# Patient Record
Sex: FEMALE | Race: WHITE | NOT HISPANIC OR LATINO
[De-identification: names, ages, dates, MRNs, and addresses within clinical notes are randomized per-mention and may not be internally consistent; named-entity substitution may affect disease eponyms.]

---

## 2019-11-25 ENCOUNTER — FORM ENCOUNTER (OUTPATIENT)
Age: 59
End: 2019-11-25

## 2020-05-07 ENCOUNTER — TRANSCRIPTION ENCOUNTER (OUTPATIENT)
Age: 60
End: 2020-05-07

## 2020-12-31 DIAGNOSIS — Z00.00 ENCOUNTER FOR GENERAL ADULT MEDICAL EXAMINATION W/OUT ABNORMAL FINDINGS: ICD-10-CM

## 2021-01-26 ENCOUNTER — TRANSCRIPTION ENCOUNTER (OUTPATIENT)
Age: 61
End: 2021-01-26

## 2021-03-24 DIAGNOSIS — Z87.891 PERSONAL HISTORY OF NICOTINE DEPENDENCE: ICD-10-CM

## 2021-03-24 DIAGNOSIS — Z80.49 FAMILY HISTORY OF MALIGNANT NEOPLASM OF OTHER GENITAL ORGANS: ICD-10-CM

## 2021-03-24 DIAGNOSIS — Z78.9 OTHER SPECIFIED HEALTH STATUS: ICD-10-CM

## 2021-03-24 DIAGNOSIS — N63.0 UNSPECIFIED LUMP IN UNSPECIFIED BREAST: ICD-10-CM

## 2021-03-29 ENCOUNTER — TRANSCRIPTION ENCOUNTER (OUTPATIENT)
Age: 61
End: 2021-03-29

## 2021-03-29 ENCOUNTER — APPOINTMENT (OUTPATIENT)
Dept: BREAST CENTER | Facility: CLINIC | Age: 61
End: 2021-03-29
Payer: COMMERCIAL

## 2021-03-29 VITALS
SYSTOLIC BLOOD PRESSURE: 144 MMHG | WEIGHT: 168 LBS | HEIGHT: 67 IN | BODY MASS INDEX: 26.37 KG/M2 | HEART RATE: 96 BPM | DIASTOLIC BLOOD PRESSURE: 93 MMHG

## 2021-03-29 DIAGNOSIS — Z78.9 OTHER SPECIFIED HEALTH STATUS: ICD-10-CM

## 2021-03-29 PROCEDURE — 99072 ADDL SUPL MATRL&STAF TM PHE: CPT

## 2021-03-29 PROCEDURE — 99213 OFFICE O/P EST LOW 20 MIN: CPT

## 2021-03-29 RX ORDER — ESTRADIOL 2 MG/1
2 RING VAGINAL
Refills: 0 | Status: DISCONTINUED | COMMUNITY
End: 2021-03-29

## 2021-03-29 NOTE — HISTORY OF PRESENT ILLNESS
[FreeTextEntry1] : The patient is a 60-year-old G1, P1 postmenopausal white female.  She underwent menarche at age 13 and had her first child at age 31.  She underwent menopause at age 54 and never took any hormone replacement therapy.  She has no family history of breast cancer but her mother had ovarian cancer at age 55.  The patient has a history of fibrocystic mastopathy and comes in for routine yearly clinical exams and gets yearly mammography and ultrasound.

## 2021-03-29 NOTE — ASSESSMENT
[FreeTextEntry1] : The patient is a 60-year-old G1, P1 postmenopausal white female.  She underwent menarche at age 13 and had her first child at age 31.  She underwent menopause at age 54 and never took any hormone replacement therapy.  She has no family history of breast cancer but her mother had ovarian cancer at age 55.  The patient has a history of fibrocystic mastopathy.  On exam today I cannot feel any suspicious densities in either breast.  She underwent her last mammography and ultrasound at [facility] on January 4, 2021 just showing some benign intramammary lymph nodes which are unchanged.  The patient was reassured and should follow-up again in 1 year and will be due for her next bilateral mammography and ultrasound in January 2022.

## 2021-03-29 NOTE — ASSESSMENT
[FreeTextEntry1] : The patient is a 60-year-old G1, P1 postmenopausal white female.  She underwent menarche at age 13 and had her first child at age 31.  She underwent menopause at age 54 and never took any hormone replacement therapy.  She has no family history of breast cancer but her mother had ovarian cancer at age 55.  The patient has a history of fibrocystic mastopathy.  On exam today I cannot feel any suspicious densities in either breast.  She underwent her last mammography and ultrasound at [facility] on January 4, 2021 just showing some benign intramammary lymph nodes which are unchanged.  The patient was reassured and should follow-up again in 1 year and will be due for her next bilateral mammography and ultrasound in January 2022.

## 2021-03-29 NOTE — PHYSICAL EXAM
[Normocephalic] : normocephalic [Atraumatic] : atraumatic [EOMI] : extra ocular movement intact [Supple] : supple [No Supraclavicular Adenopathy] : no supraclavicular adenopathy [No Cervical Adenopathy] : no cervical adenopathy [Examined in the supine and seated position] : examined in the supine and seated position [Symmetrical] : symmetrical [Breast Mass Right Breast ___cm] : no masses [Breast Mass Left Breast ___cm] : no masses [Breast Nipple Inversion] : nipples not inverted [Breast Nipple Retraction] : nipples not retracted [Breast Nipple Flattening] : nipples not flattened [Breast Nipple Fissures] : nipples not fissured [Breast Abnormal Lactation (Galactorrhea)] : no galactorrhea [Breast Abnormal Secretion Bloody Fluid] : no bloody discharge [Breast Abnormal Secretion Serous Fluid] : no serous discharge [Breast Abnormal Secretion Opalescent Fluid] : no milky discharge [No Axillary Lymphadenopathy] : no left axillary lymphadenopathy [No Edema] : no edema [No Rashes] : no rashes [No Ulceration] : no ulceration [de-identified] : The patient has symmetrical C-cup breasts.  I cannot feel any suspicious densities in either breast.  She has no axillary, supraclavicular, or cervical adenopathy.

## 2021-03-29 NOTE — PHYSICAL EXAM
[Normocephalic] : normocephalic [Atraumatic] : atraumatic [EOMI] : extra ocular movement intact [Supple] : supple [No Supraclavicular Adenopathy] : no supraclavicular adenopathy [No Cervical Adenopathy] : no cervical adenopathy [Examined in the supine and seated position] : examined in the supine and seated position [Symmetrical] : symmetrical [Breast Mass Right Breast ___cm] : no masses [Breast Mass Left Breast ___cm] : no masses [Breast Nipple Inversion] : nipples not inverted [Breast Nipple Retraction] : nipples not retracted [Breast Nipple Flattening] : nipples not flattened [Breast Nipple Fissures] : nipples not fissured [Breast Abnormal Lactation (Galactorrhea)] : no galactorrhea [Breast Abnormal Secretion Bloody Fluid] : no bloody discharge [Breast Abnormal Secretion Serous Fluid] : no serous discharge [Breast Abnormal Secretion Opalescent Fluid] : no milky discharge [No Axillary Lymphadenopathy] : no left axillary lymphadenopathy [No Edema] : no edema [No Rashes] : no rashes [No Ulceration] : no ulceration [de-identified] : The patient has symmetrical C-cup breasts.  I cannot feel any suspicious densities in either breast.  She has no axillary, supraclavicular, or cervical adenopathy.

## 2021-03-29 NOTE — PHYSICAL EXAM
[Normocephalic] : normocephalic [Atraumatic] : atraumatic [EOMI] : extra ocular movement intact [Supple] : supple [No Supraclavicular Adenopathy] : no supraclavicular adenopathy [No Cervical Adenopathy] : no cervical adenopathy [Examined in the supine and seated position] : examined in the supine and seated position [Symmetrical] : symmetrical [Breast Mass Right Breast ___cm] : no masses [Breast Mass Left Breast ___cm] : no masses [Breast Nipple Inversion] : nipples not inverted [Breast Nipple Retraction] : nipples not retracted [Breast Nipple Flattening] : nipples not flattened [Breast Nipple Fissures] : nipples not fissured [Breast Abnormal Lactation (Galactorrhea)] : no galactorrhea [Breast Abnormal Secretion Bloody Fluid] : no bloody discharge [Breast Abnormal Secretion Serous Fluid] : no serous discharge [Breast Abnormal Secretion Opalescent Fluid] : no milky discharge [No Axillary Lymphadenopathy] : no left axillary lymphadenopathy [No Edema] : no edema [No Rashes] : no rashes [No Ulceration] : no ulceration [de-identified] : The patient has symmetrical C-cup breasts.  I cannot feel any suspicious densities in either breast.  She has no axillary, supraclavicular, or cervical adenopathy.

## 2021-03-29 NOTE — ASSESSMENT
[FreeTextEntry1] : The patient is a 60-year-old G1, P1 postmenopausal white female.  She underwent menarche at age 13 and had her first child at age 31.  She underwent menopause at age 54 and never took any hormone replacement therapy.  She has no family history of breast cancer but her mother had ovarian cancer at age 55.  The patient has a history of fibrocystic mastopathy.  On exam today I cannot feel any suspicious densities in either breast.  She underwent her last mammography and ultrasound at [facility] on January 4, 2021 just showing some benign intramammary lymph nodes which are unchanged.  The patient was reassured and should follow-up again in 1 year and will be due for her next bilateral mammography and ultrasound in January 2022.

## 2021-03-29 NOTE — REASON FOR VISIT
[Follow-Up: _____] : a [unfilled] follow-up visit [FreeTextEntry1] : The patient comes in for routine yearly follow-up with a history of fibrocystic mastopathy

## 2022-01-23 ENCOUNTER — TRANSCRIPTION ENCOUNTER (OUTPATIENT)
Age: 62
End: 2022-01-23

## 2022-01-31 ENCOUNTER — APPOINTMENT (OUTPATIENT)
Dept: BREAST CENTER | Facility: CLINIC | Age: 62
End: 2022-01-31
Payer: COMMERCIAL

## 2022-01-31 PROCEDURE — 99213 OFFICE O/P EST LOW 20 MIN: CPT

## 2022-01-31 NOTE — HISTORY OF PRESENT ILLNESS
[FreeTextEntry1] : The patient is a 61-year-old G1, P1 postmenopausal white female.  She underwent menarche at age 13 and had her first child at age 31.  She underwent menopause at age 54 and never took any hormone replacement therapy.  She has no family history of breast cancer but her mother had ovarian cancer at age 55.  The patient has a history of fibrocystic mastopathy and comes in for routine yearly clinical exams and gets yearly mammography and ultrasound.

## 2022-01-31 NOTE — ASSESSMENT
[FreeTextEntry1] : The patient is a 61-year-old G1, P1 postmenopausal white female.  She underwent menarche at age 13 and had her first child at age 31.  She underwent menopause at age 54 and never took any hormone replacement therapy.  She has no family history of breast cancer but her mother had ovarian cancer at age 55.  The patient has a history of fibrocystic mastopathy.  On exam today, I cannot feel any suspicious densities in either breast.  She underwent her last mammography and ultrasound which was reviewed from [facility] performed on January 27, 2022 just showing no suspicious findings.  The patient was reassured and should follow-up again in 1 year and will be due for her next bilateral mammography and ultrasound in January 2023 and she was given prescriptions.

## 2022-01-31 NOTE — PHYSICAL EXAM
[Normocephalic] : normocephalic [Atraumatic] : atraumatic [EOMI] : extra ocular movement intact [Supple] : supple [No Supraclavicular Adenopathy] : no supraclavicular adenopathy [No Cervical Adenopathy] : no cervical adenopathy [Examined in the supine and seated position] : examined in the supine and seated position [Symmetrical] : symmetrical [Breast Mass Right Breast ___cm] : no masses [Breast Mass Left Breast ___cm] : no masses [No Axillary Lymphadenopathy] : no left axillary lymphadenopathy [No Edema] : no edema [No Rashes] : no rashes [No Ulceration] : no ulceration [Breast Nipple Inversion] : nipples not inverted [Breast Nipple Retraction] : nipples not retracted [Breast Nipple Flattening] : nipples not flattened [Breast Nipple Fissures] : nipples not fissured [Breast Abnormal Lactation (Galactorrhea)] : no galactorrhea [Breast Abnormal Secretion Bloody Fluid] : no bloody discharge [Breast Abnormal Secretion Serous Fluid] : no serous discharge [Breast Abnormal Secretion Opalescent Fluid] : no milky discharge [de-identified] : The patient has symmetrical C-cup breasts.  I cannot feel any suspicious densities in either breast.  She has no axillary, supraclavicular, or cervical adenopathy.

## 2022-04-08 ENCOUNTER — TRANSCRIPTION ENCOUNTER (OUTPATIENT)
Age: 62
End: 2022-04-08

## 2022-11-09 ENCOUNTER — NON-APPOINTMENT (OUTPATIENT)
Age: 62
End: 2022-11-09

## 2023-02-06 ENCOUNTER — APPOINTMENT (OUTPATIENT)
Dept: BREAST CENTER | Facility: CLINIC | Age: 63
End: 2023-02-06
Payer: COMMERCIAL

## 2023-02-06 VITALS — BODY MASS INDEX: 26.37 KG/M2 | HEIGHT: 67 IN | WEIGHT: 168 LBS

## 2023-02-06 PROCEDURE — 99213 OFFICE O/P EST LOW 20 MIN: CPT

## 2023-02-06 NOTE — HISTORY OF PRESENT ILLNESS
[FreeTextEntry1] : The patient is a 62-year-old G1, P1 postmenopausal white female.  She underwent menarche at age 13 and had her first child at age 31.  She underwent menopause at age 54 and never took any hormone replacement therapy.  She has no family history of breast cancer but her mother had ovarian cancer at age 55.  The patient has a history of fibrocystic mastopathy and comes in for routine yearly clinical exams and gets yearly mammography and ultrasound.

## 2023-02-06 NOTE — ASSESSMENT
[FreeTextEntry1] : The patient is a 62-year-old G1, P1 postmenopausal white female.  She underwent menarche at age 13 and had her first child at age 31.  She underwent menopause at age 54 and never took any hormone replacement therapy.  She has no family history of breast cancer but her mother had ovarian cancer at age 55.  The patient has a history of fibrocystic mastopathy.  On exam today, I cannot feel any suspicious densities in either breast.  She underwent her last mammography and ultrasound which was I reviewed on CD-ROM from [facility] performed on January 31, 2023 just showing no suspicious findings but I am still awaiting for the official radiology report.  The patient was reassured and should follow-up again in 1 year and will be due for her next bilateral mammography and ultrasound in January 2024 and she was given prescriptions.

## 2023-02-14 DIAGNOSIS — R92.1 MAMMOGRAPHIC CALCIFICATION FOUND ON DIAGNOSTIC IMAGING OF BREAST: ICD-10-CM

## 2023-02-14 DIAGNOSIS — R92.8 OTHER ABNORMAL AND INCONCLUSIVE FINDINGS ON DIAGNOSTIC IMAGING OF BREAST: ICD-10-CM

## 2023-03-15 ENCOUNTER — NON-APPOINTMENT (OUTPATIENT)
Age: 63
End: 2023-03-15

## 2023-12-07 ENCOUNTER — NON-APPOINTMENT (OUTPATIENT)
Age: 63
End: 2023-12-07

## 2024-02-04 ENCOUNTER — NON-APPOINTMENT (OUTPATIENT)
Age: 64
End: 2024-02-04

## 2024-02-14 NOTE — HISTORY OF PRESENT ILLNESS
[FreeTextEntry1] : The patient is a 63-year-old G1, P1 postmenopausal white female.  She underwent menarche at age 13 and had her first child at age 31.  She underwent menopause at age 54 and never took any hormone replacement therapy.  She has no family history of breast cancer but her mother had ovarian cancer at age 55.  She underwent a right breast lateral stereotactic core biopsy at [facility] on March 23, 2023 showing fibrocystic change and stromal fibrosis with benign microcalcifications.  She comes in for routine yearly clinical exam and gets yearly mammography and ultrasound.

## 2024-02-14 NOTE — ASSESSMENT
[FreeTextEntry1] : The patient is a 63-year-old G1, P1 postmenopausal white female.  She underwent menarche at age 13 and had her first child at age 31.  She underwent menopause at age 54 and never took any hormone replacement therapy.  She has no family history of breast cancer but her mother had ovarian cancer at age 55.  The patient has a history of fibrocystic mastopathy.  She underwent a mammography and ultrasound which was reviewed on CD-ROM from [facility] performed on January 31, 2023 which showed some indeterminate calcifications in the lateral right breast which persisted on diagnostic mammography on February 17, 2023.  Stereotactic right breast biopsy was performed on March 23, 2023 at [facility] which is showed fibrocystic changes and stromal fibrosis with benign calcifications.  Her last bilateral mammography and ultrasound were reviewed from ????????.  On exam today, I cannot feel any suspicious densities in either breast.    The patient was reassured and should follow-up again in 1 year and will be due for her next bilateral mammography and ultrasound in ??????? 2025 and she was given prescriptions.

## 2024-02-14 NOTE — PHYSICAL EXAM
[Normocephalic] : normocephalic [Atraumatic] : atraumatic [EOMI] : extra ocular movement intact [Supple] : supple [No Supraclavicular Adenopathy] : no supraclavicular adenopathy [No Cervical Adenopathy] : no cervical adenopathy [Examined in the supine and seated position] : examined in the supine and seated position [Breast Mass Right Breast ___cm] : no masses [Symmetrical] : symmetrical [Breast Mass Left Breast ___cm] : no masses [No Axillary Lymphadenopathy] : no left axillary lymphadenopathy [No Edema] : no edema [No Rashes] : no rashes [No Ulceration] : no ulceration [Breast Nipple Inversion] : nipples not inverted [Breast Nipple Retraction] : nipples not retracted [Breast Nipple Flattening] : nipples not flattened [Breast Nipple Fissures] : nipples not fissured [Breast Abnormal Lactation (Galactorrhea)] : no galactorrhea [Breast Abnormal Secretion Bloody Fluid] : no bloody discharge [Breast Abnormal Secretion Serous Fluid] : no serous discharge [Breast Abnormal Secretion Opalescent Fluid] : no milky discharge [de-identified] : The patient has symmetrical C-cup breasts.  I cannot feel any suspicious densities in either breast.  She has no axillary, supraclavicular, or cervical adenopathy.

## 2024-02-21 ENCOUNTER — APPOINTMENT (OUTPATIENT)
Dept: BREAST CENTER | Facility: CLINIC | Age: 64
End: 2024-02-21
Payer: COMMERCIAL

## 2024-02-21 DIAGNOSIS — N60.12 DIFFUSE CYSTIC MASTOPATHY OF LEFT BREAST: ICD-10-CM

## 2024-02-21 DIAGNOSIS — Z80.41 FAMILY HISTORY OF MALIGNANT NEOPLASM OF OVARY: ICD-10-CM

## 2024-02-21 DIAGNOSIS — N60.11 DIFFUSE CYSTIC MASTOPATHY OF LEFT BREAST: ICD-10-CM

## 2024-02-21 DIAGNOSIS — R92.30 DENSE BREASTS, UNSPECIFIED: ICD-10-CM

## 2024-02-21 PROCEDURE — 99212 OFFICE O/P EST SF 10 MIN: CPT

## 2024-02-21 NOTE — PHYSICAL EXAM
[Normocephalic] : normocephalic [Atraumatic] : atraumatic [EOMI] : extra ocular movement intact [Supple] : supple [No Supraclavicular Adenopathy] : no supraclavicular adenopathy [No Cervical Adenopathy] : no cervical adenopathy [Examined in the supine and seated position] : examined in the supine and seated position [Symmetrical] : symmetrical [Breast Mass Right Breast ___cm] : no masses [Breast Mass Left Breast ___cm] : no masses [No Axillary Lymphadenopathy] : no left axillary lymphadenopathy [No Edema] : no edema [No Rashes] : no rashes [No Ulceration] : no ulceration [Breast Nipple Inversion] : nipples not inverted [Breast Nipple Retraction] : nipples not retracted [Breast Nipple Flattening] : nipples not flattened [Breast Nipple Fissures] : nipples not fissured [Breast Abnormal Lactation (Galactorrhea)] : no galactorrhea [Breast Abnormal Secretion Bloody Fluid] : no bloody discharge [Breast Abnormal Secretion Serous Fluid] : no serous discharge [Breast Abnormal Secretion Opalescent Fluid] : no milky discharge [de-identified] : The patient has symmetrical C-cup breasts.  I cannot feel any suspicious densities in either breast.  She has no axillary, supraclavicular, or cervical adenopathy.

## 2024-02-21 NOTE — HISTORY OF PRESENT ILLNESS
[FreeTextEntry1] : The patient is a 63-year-old G1, P1 postmenopausal white female.  She underwent menarche at age 13 and had her first child at age 31.  She underwent menopause at age 54 and never took any hormone replacement therapy.  She has no family history of breast cancer but her mother had ovarian cancer at age 55.  She underwent a right breast lateral stereotactic core biopsy at [facility] on March 23, 2023 showing fibrocystic change and stromal fibrosis with benign microcalcifications.  She comes in for routine yearly clinical exam and gets yearly mammography and ultrasound.

## 2024-02-21 NOTE — ASSESSMENT
[FreeTextEntry1] : The patient is a 63-year-old G1, P1 postmenopausal white female.  She underwent menarche at age 13 and had her first child at age 31.  She underwent menopause at age 54 and never took any hormone replacement therapy.  She has no family history of breast cancer but her mother had ovarian cancer at age 55.  The patient has a history of fibrocystic mastopathy.  She underwent a mammography and ultrasound which was reviewed on CD-ROM from [facility] performed on January 31, 2023 which showed some indeterminate calcifications in the lateral right breast which persisted on diagnostic mammography on February 17, 2023.  Stereotactic right breast biopsy was performed on March 23, 2023 at [facility] which showed fibrocystic changes and stromal fibrosis with benign calcifications.  Her last bilateral mammography and ultrasound were reviewed from February 1, 2024 performed at [facility] which showed no suspicious findings.  On exam today, I cannot feel any suspicious densities in either breast.  The patient was reassured and should follow-up again in 1 year and will be due for her next bilateral mammography and ultrasound in February 2025 and she was given prescriptions.

## 2024-04-16 NOTE — PHYSICAL EXAM
Patient:   : 1928    Encounter Date: 2024    error      Electronically Signed By: SANIYA Burt   24 11:18 PM   [Normocephalic] : normocephalic [Atraumatic] : atraumatic [EOMI] : extra ocular movement intact [Supple] : supple [No Supraclavicular Adenopathy] : no supraclavicular adenopathy [No Cervical Adenopathy] : no cervical adenopathy [Examined in the supine and seated position] : examined in the supine and seated position [Symmetrical] : symmetrical [Breast Mass Right Breast ___cm] : no masses [Breast Mass Left Breast ___cm] : no masses [No Axillary Lymphadenopathy] : no left axillary lymphadenopathy [No Edema] : no edema [No Rashes] : no rashes [No Ulceration] : no ulceration [Breast Nipple Inversion] : nipples not inverted [Breast Nipple Retraction] : nipples not retracted [Breast Nipple Flattening] : nipples not flattened [Breast Nipple Fissures] : nipples not fissured [Breast Abnormal Lactation (Galactorrhea)] : no galactorrhea [Breast Abnormal Secretion Bloody Fluid] : no bloody discharge [Breast Abnormal Secretion Serous Fluid] : no serous discharge [Breast Abnormal Secretion Opalescent Fluid] : no milky discharge [de-identified] : The patient has symmetrical C-cup breasts.  I cannot feel any suspicious densities in either breast.  She has no axillary, supraclavicular, or cervical adenopathy.

## 2025-01-13 ENCOUNTER — NON-APPOINTMENT (OUTPATIENT)
Age: 65
End: 2025-01-13

## 2025-01-30 ENCOUNTER — NON-APPOINTMENT (OUTPATIENT)
Age: 65
End: 2025-01-30

## 2025-02-28 ENCOUNTER — APPOINTMENT (OUTPATIENT)
Dept: BREAST CENTER | Facility: CLINIC | Age: 65
End: 2025-02-28
Payer: COMMERCIAL

## 2025-02-28 DIAGNOSIS — Z80.41 FAMILY HISTORY OF MALIGNANT NEOPLASM OF OVARY: ICD-10-CM

## 2025-02-28 DIAGNOSIS — Z12.31 ENCOUNTER FOR SCREENING MAMMOGRAM FOR MALIGNANT NEOPLASM OF BREAST: ICD-10-CM

## 2025-02-28 DIAGNOSIS — R92.30 DENSE BREASTS, UNSPECIFIED: ICD-10-CM

## 2025-02-28 DIAGNOSIS — N60.12 DIFFUSE CYSTIC MASTOPATHY OF LEFT BREAST: ICD-10-CM

## 2025-02-28 DIAGNOSIS — N60.11 DIFFUSE CYSTIC MASTOPATHY OF LEFT BREAST: ICD-10-CM

## 2025-02-28 PROCEDURE — 99213 OFFICE O/P EST LOW 20 MIN: CPT
